# Patient Record
Sex: MALE | Race: WHITE | NOT HISPANIC OR LATINO | Employment: OTHER | URBAN - NONMETROPOLITAN AREA
[De-identification: names, ages, dates, MRNs, and addresses within clinical notes are randomized per-mention and may not be internally consistent; named-entity substitution may affect disease eponyms.]

---

## 2023-09-24 ENCOUNTER — APPOINTMENT (OUTPATIENT)
Dept: GENERAL RADIOLOGY | Facility: HOSPITAL | Age: 68
End: 2023-09-24
Payer: MEDICARE

## 2023-09-24 ENCOUNTER — HOSPITAL ENCOUNTER (EMERGENCY)
Facility: HOSPITAL | Age: 68
Discharge: HOME | End: 2023-09-24
Attending: EMERGENCY MEDICINE | Admitting: EMERGENCY MEDICINE
Payer: MEDICARE

## 2023-09-24 VITALS
WEIGHT: 205 LBS | TEMPERATURE: 99 F | RESPIRATION RATE: 17 BRPM | HEIGHT: 74 IN | SYSTOLIC BLOOD PRESSURE: 145 MMHG | HEART RATE: 109 BPM | OXYGEN SATURATION: 97 % | DIASTOLIC BLOOD PRESSURE: 74 MMHG | BODY MASS INDEX: 26.31 KG/M2

## 2023-09-24 DIAGNOSIS — S90.861A TICK BITE OF RIGHT FOOT, INITIAL ENCOUNTER: ICD-10-CM
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
## 2023-09-24 DIAGNOSIS — B34.9 VIRAL ILLNESS: Primary | ICD-10-CM

## 2023-09-24 PROCEDURE — 99283 EMERGENCY DEPT VISIT LOW MDM: CPT

## 2023-09-24 PROCEDURE — 0202U NFCT DS 22 TRGT SARS-COV-2: CPT | Performed by: NURSE PRACTITIONER

## 2023-09-24 PROCEDURE — 71045 X-RAY EXAM CHEST 1 VIEW: CPT

## 2023-09-24 RX ORDER — PREDNISONE 20 MG/1
20 TABLET ORAL 2 TIMES DAILY
Qty: 10 TABLET | Refills: 0 | Status: SHIPPED | OUTPATIENT
Start: 2023-09-24

## 2023-09-24 RX ORDER — DOXYCYCLINE 100 MG/1
100 CAPSULE ORAL 2 TIMES DAILY
Qty: 10 CAPSULE | Refills: 0 | Status: SHIPPED | OUTPATIENT
Start: 2023-09-24 | End: 2023-09-29

## 2023-09-24 RX ORDER — ONDANSETRON 4 MG/1
4 TABLET, ORALLY DISINTEGRATING ORAL EVERY 8 HOURS PRN
Qty: 12 TABLET | Refills: 0 | Status: SHIPPED | OUTPATIENT
Start: 2023-09-24

## 2023-09-24 NOTE — DISCHARGE INSTRUCTIONS
Drink lots of fluids, rest  Take meds as directed  If symptoms worsen or do not improve please see PCP in follow up

## 2023-09-24 NOTE — ED PROVIDER NOTES
Subjective   History of Present Illness  68-year-old male presents to the ED today for complaint of low-grade temp, runny nose, cough.  He has had some viral symptoms.  He talked to a family member that thought he might have rhinovirus.  He has been on 2 or 3 antibiotics treating him for strep.  He said he could not tolerate the antibiotics so he stopped taking them.  He did have a blister on his right foot that he opened where a couple ticks were attached.  He was concerned about rupturing the blister.  He has no nausea, vomiting or diarrhea.  No other symptoms today.  He just wanted to be evaluated.    Review of Systems   Constitutional: Negative.    HENT:  Positive for postnasal drip, sore throat and voice change.    Eyes: Negative.    Respiratory: Negative.     Cardiovascular: Negative.    Gastrointestinal: Negative.    Endocrine: Negative.    Genitourinary: Negative.    Musculoskeletal: Negative.    Skin:  Positive for wound.   Allergic/Immunologic: Negative.    Neurological: Negative.    Hematological: Negative.    Psychiatric/Behavioral: Negative.       History reviewed. No pertinent past medical history.    Allergies   Allergen Reactions    Sulfa Antibiotics Nausea Only       No past surgical history on file.    History reviewed. No pertinent family history.    Social History     Socioeconomic History    Marital status:            Objective   Physical Exam  Vitals and nursing note reviewed. Exam conducted with a chaperone present.   Constitutional:       Appearance: Normal appearance.   HENT:      Head: Normocephalic and atraumatic.      Right Ear: External ear normal.      Left Ear: External ear normal.      Nose: Nose normal.      Mouth/Throat:      Mouth: Mucous membranes are moist.      Pharynx: Oropharynx is clear.   Eyes:      Extraocular Movements: Extraocular movements intact.      Pupils: Pupils are equal, round, and reactive to light.   Cardiovascular:      Rate and Rhythm: Regular rhythm.  Tachycardia present.      Pulses: Normal pulses.      Heart sounds: Normal heart sounds.      Comments: 109  Pulmonary:      Effort: Pulmonary effort is normal.      Breath sounds: Normal breath sounds.   Abdominal:      General: Bowel sounds are normal.   Musculoskeletal:         General: Normal range of motion.      Cervical back: Normal range of motion.   Skin:     General: Skin is warm.      Capillary Refill: Capillary refill takes less than 2 seconds.      Comments: Wound on foot with small amount of erythema where patient opened blister.    Neurological:      Mental Status: He is alert and oriented to person, place, and time.   Psychiatric:         Mood and Affect: Mood normal.         Behavior: Behavior normal.       Procedures           ED Course                                           Medical Decision Making  68-year-old male presents to the ED today for complaint of some viral symptoms.  He does have a low-grade temp at 99.  His biggest concern is the area on his right foot that he opened.  He says that 2 ticks were attached to that area previously.  He does have some drainage.  He has no shortness of breath or chest pain.  No nausea, vomiting or diarrhea.  No other symptoms today.  Patient has heart rate that is slightly elevated today but otherwise afebrile with normal blood pressure.  Differential diagnosis includes viral illness, rash from tick bite, he has been tested for Lyme in the past.  It was negative.  We will check patient for respiratory illnesses with a respiratory panel.  We will do chest x-ray.  Respiratory panel was negative today.  Chest x-ray showed nothing acute.  Will place patient on Doxycycline to prevent lyme have patient follow-up with PCP.    Amount and/or Complexity of Data Reviewed  Radiology: ordered.        Final diagnoses:   Viral illness   Tick bite of right foot, initial encounter       ED Disposition  ED Disposition       ED Disposition   Discharge    Condition   Stable     Comment   --               No follow-up provider specified.       Medication List        New Prescriptions      doxycycline 100 MG capsule  Commonly known as: MONODOX  Take 1 capsule by mouth 2 (Two) Times a Day for 5 days.     ondansetron ODT 4 MG disintegrating tablet  Commonly known as: ZOFRAN-ODT  Place 1 tablet on the tongue Every 8 (Eight) Hours As Needed for Nausea or Vomiting.               Where to Get Your Medications        Information about where to get these medications is not yet available    Ask your nurse or doctor about these medications  doxycycline 100 MG capsule  ondansetron ODT 4 MG disintegrating tablet            Karon Lou, APRN  09/24/23 [id number]